# Patient Record
Sex: FEMALE | Race: WHITE | URBAN - METROPOLITAN AREA
[De-identification: names, ages, dates, MRNs, and addresses within clinical notes are randomized per-mention and may not be internally consistent; named-entity substitution may affect disease eponyms.]

---

## 2017-03-29 RX ORDER — PRAVASTATIN SODIUM 20 MG/1
TABLET ORAL
Qty: 90 TAB | Refills: 4 | Status: SHIPPED | OUTPATIENT
Start: 2017-03-29

## 2017-04-27 ENCOUNTER — OFFICE VISIT (OUTPATIENT)
Dept: ENDOCRINOLOGY | Age: 67
End: 2017-04-27

## 2017-04-27 VITALS
RESPIRATION RATE: 20 BRPM | DIASTOLIC BLOOD PRESSURE: 68 MMHG | HEIGHT: 66 IN | BODY MASS INDEX: 22.02 KG/M2 | OXYGEN SATURATION: 97 % | SYSTOLIC BLOOD PRESSURE: 131 MMHG | TEMPERATURE: 97.3 F | WEIGHT: 137 LBS | HEART RATE: 90 BPM

## 2017-04-27 DIAGNOSIS — I10 ESSENTIAL HYPERTENSION: ICD-10-CM

## 2017-04-27 DIAGNOSIS — R79.89 ELEVATED LFTS: ICD-10-CM

## 2017-04-27 DIAGNOSIS — E11.65 UNCONTROLLED TYPE 2 DIABETES MELLITUS WITH HYPERGLYCEMIA, WITHOUT LONG-TERM CURRENT USE OF INSULIN (HCC): Primary | ICD-10-CM

## 2017-04-27 RX ORDER — GLIPIZIDE 10 MG/1
TABLET ORAL
Qty: 45 TAB | Refills: 6 | Status: SHIPPED | OUTPATIENT
Start: 2017-04-27

## 2017-04-27 RX ORDER — METFORMIN HYDROCHLORIDE 500 MG/1
500 TABLET ORAL
Qty: 30 TAB | Refills: 6 | Status: SHIPPED | OUTPATIENT
Start: 2017-04-27

## 2017-04-27 RX ORDER — GABAPENTIN 100 MG/1
1 CAPSULE ORAL 3 TIMES DAILY
Refills: 2 | COMMUNITY
Start: 2017-03-20

## 2017-04-27 NOTE — PROGRESS NOTES
HISTORY OF PRESENT ILLNESS  Rayne Live is a 69 y.o. female. HPI  Patient here for  F/u after last visit of Type 2 diabetes mellitus from oct 28 2016     Lost 10  lbs   She is doing TLC     She has not had any  lows  on  glipizide   Feels  good     Here to discuss labs            Old history :  .   Referred : by self/pcp    H/o diabetes for 4 months   She had to go for PET scan and could not get it done as her blood sugars are very high   She has no control on diet     She has several cancers , Renal cell , Breast right , Lung cancer left side     She was sick and ended up at ER - and was told to f/u on DM2   In the process of evaluation -  She had lesions of spleen and liver   She had to go for PET scan and could nt be sone sooner -- because of high sugars     Current A1C is 7 %  and symptoms/problems include hyperglycemias   Blood sugars are above 200 mg     Current diabetic medications include glucophage 500 mg bid and glipizide xr 5 mg a day     Current monitoring regimen: home blood tests - 2 times daily  Home blood sugar records: trend: fluctuating a lot  Any episodes of hypoglycemia? no    Weight trend: fluctuating a lot  Prior visit with dietician: no  Current diet: \"unhealthy\" diet in general  Current exercise:  regular exercise,   Known diabetic complications: none  Cardiovascular risk factors: diabetes mellitus, stress, post-menopausal    Eye exam current (within one year): yes  FRANCIA: no     Past Medical History:   Diagnosis Date    Cancer ([facility] [address]     renal, lung, breast     Past Surgical History:   Procedure Laterality Date    HX HYSTERECTOMY      age 23 d/t tumor    HX MASTECTOMY      2010 rt breast    HX OTHER SURGICAL Right     stent placement      Current Outpatient Prescriptions   Medication Sig    gabapentin (NEURONTIN) 100 mg capsule Take 1 Cap by mouth three (3) times daily.     pravastatin (PRAVACHOL) 20 mg tablet TAKE 1 TABLET BY MOUTH AT BEDTIME    metFORMIN (GLUCOPHAGE) 500 mg tablet Take 1 Tab by mouth daily (with breakfast).  glipiZIDE (GLUCOTROL) 10 mg tablet TAKE 1 TABLET BY MOUTH 20 MINUTES BEFORE BREAKFAST AND DINNER, STOP GLIPIZIDE ER    FREESTYLE LITE STRIPS strip USE TO CHECK BLOOD SUGAR 2 TIMES DAILY. DX CODE 250.00    amLODIPine (NORVASC) 5 mg tablet Take 1 Tab by mouth daily.  Lancets (FREESTYLE LANCETS) misc Use to check blood sugar 2 times daily. Dx code 80.1    clopidogrel (PLAVIX) 75 mg tablet Take  by mouth daily.  ergocalciferol (DRISDOL) 50,000 unit capsule Take 1 cap by mouth once a week  for 2 months then 1 cap by mouth every 2 weeks for 2 months    nabumetone (RELAFEN) 750 mg tablet Take 750 mg by mouth two (2) times a day.  lansoprazole (PREVACID) 30 mg capsule Take  by mouth Daily (before breakfast). No current facility-administered medications for this visit. Review of Systems   Constitutional: Negative. HENT: Negative. Eyes: Negative for pain and redness. Respiratory: Negative. Cardiovascular: Negative for chest pain, palpitations and leg swelling. Gastrointestinal: Negative. Negative for constipation. Genitourinary: Negative. Musculoskeletal: Negative for myalgias. Skin: Negative. Neurological: Negative. Endo/Heme/Allergies: Negative. Psychiatric/Behavioral: Negative for depression and memory loss. The patient does not have insomnia. Physical Exam   Constitutional: She is oriented to person, place, and time. She appears well-developed and well-nourished. HENT:   Head: Normocephalic. Eyes: Conjunctivae and EOM are normal. Pupils are equal, round, and reactive to light. Neck: Normal range of motion. Neck supple. No JVD present. No tracheal deviation present. No thyromegaly present. Cardiovascular: Normal rate, regular rhythm and normal heart sounds. No murmur heard. Pulmonary/Chest: Breath sounds normal.   Abdominal: Soft.  Bowel sounds are normal.   Musculoskeletal: Normal range of motion. Lymphadenopathy:     She has no cervical adenopathy. Neurological: She is alert and oriented to person, place, and time. She has normal reflexes. Skin: Skin is warm. Psychiatric: She has a normal mood and affect. 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   Good pulses present         Lab Results   Component Value Date/Time    Hemoglobin A1c 5.4 04/20/2017 11:10 AM    Hemoglobin A1c 5.7 10/26/2016 01:52 PM    Hemoglobin A1c 5.8 04/18/2016 01:48 PM    Glucose 162 04/20/2017 11:10 AM    Glucose  07/14/2015 01:47 PM    Microalb/Creat ratio (ug/mg creat.) 37.0 04/20/2017 11:10 AM    LDL, calculated 54 04/20/2017 11:10 AM    Creatinine 1.02 04/20/2017 11:10 AM      Lab Results   Component Value Date/Time    Cholesterol, total 118 04/20/2017 11:10 AM    HDL Cholesterol 49 04/20/2017 11:10 AM    LDL, calculated 54 04/20/2017 11:10 AM    Triglyceride 73 04/20/2017 11:10 AM     Lab Results   Component Value Date/Time    ALT (SGPT) 22 04/20/2017 11:10 AM    AST (SGOT) 47 04/20/2017 11:10 AM    Alk. phosphatase 152 04/20/2017 11:10 AM    Bilirubin, total 0.4 04/20/2017 11:10 AM     Lab Results   Component Value Date/Time    GFR est AA 65 04/20/2017 11:10 AM    GFR est non-AA 56 04/20/2017 11:10 AM    Creatinine 1.02 04/20/2017 11:10 AM    BUN 14 04/20/2017 11:10 AM    Sodium 141 04/20/2017 11:10 AM    Potassium 4.7 04/20/2017 11:10 AM    Chloride 101 04/20/2017 11:10 AM    CO2 21 04/20/2017 11:10 AM          ASSESSMENT and PLAN     1.  Type 2 DM uncontrolled : a1c is  5.4 %      From   April 2017    Compared to   5.7 %  From   Oct 2016  Compared to  5.8 %   From April 2016  Compared to  6 %    From oct 2015 compared to   6.2 %    From June 2015 compared to   5.9 %  From march 20-15 compared to 5.3 %    From dec 2014 compared to  5.4 %  From June 2014 compared to   5.5 % from March 2014 compared to   5.7 % from dec 2013 compared to  7.2 % from nov 2013     She maintained excellent control thus far Her log is great   I  stopped glucophage   For creat of 1.5  , in the past     Creat is  down to  1.12  And started on metformin ER back at 500 mg a day last visit in oct 2016  Decreased  glipizide to 5 mg AM and 10 mg PM      She has done well since - April 2017 visit     Her GFR fluctuates and it is in better condition than before   egfr 57 ml/ min     Reviewed the glucose log  In good control     Advised again about meal consistency and timing of glipzide     Patient is advised to check blood sugars 2 times daily by rotation method. reviewed medications and side effects in detail  lab results and schedule of future lab studies reviewed with patient      2. Hypoglycemia :  Educated on treating the hypoglycemia. 3. HTN : only on norvasc 5 mg a day    Single kidney  She is off prinivil 10 mg a day (  stopped )  Patient is educated about importance of compliance with anti-hypertensives especially ARB/ACEI    4. Dyslipidemia : continue pravachol, NMR lipo is some better    Patient is educated about benefits and adverse effects of statins and explained how benefits outweigh risk. 5. use of aspirin to prevent MI and TIA's discussed    6. H/o cancers -- kidney, breast, lung  - multiple cancers   She has one kidney   She is  Coping up with it gradually     7. Neuropathy :  elavil  50 mg hs, no neurontin    8. PAD : She is s/p surgery on right LE        9. Vit d deficiency :  continue drisdol , levels missed       10. CRI -  Creat is down to 1.12 compared to   1.4   From march 2015 compared to 1.5 compared to 1.4 from July 2014   Stopped metformin   Stage 2 ckd - stable         11.  Osteoporosis : get DEXA   On tamoxifen       12. hypoalb  noted - encouraged more protein         13. elevated LFT - will get  Liver usg   Check labs in 3 months         F/u in 6 months   > 50 % of time is spent on counseling

## 2017-04-27 NOTE — MR AVS SNAPSHOT
Visit Information Date & Time Provider Department Dept. Phone Encounter #  
 4/27/2017  1:30 PM Ramirez Ortiz MD [facility] Diabetes & Endocrinology [phone] [id number] Follow-up Instructions Return in about 6 months (around 10/27/2017). Upcoming Health Maintenance Date Due Hepatitis C Screening 1950 FOOT EXAM Q1 1/22/1958 EYE EXAM RETINAL OR DILATED Q1 1/22/1958 DTaP/Tdap/Td series (1 - Tdap) 1/22/1969 BREAST CANCER SCRN MAMMOGRAM 1/22/1998 FOBT Q 1 YEAR AGE 50-75 1/22/1998 ZOSTER VACCINE AGE 60> 1/22/2008 GLAUCOMA SCREENING Q2Y 1/22/2013 Pneumococcal 65+ High/Highest Risk (1 of 2 - PCV13) 1/22/2013 MEDICARE YEARLY EXAM 1/22/2013 INFLUENZA AGE 9 TO ADULT 8/1/2016 HEMOGLOBIN A1C Q6M 10/20/2017 MICROALBUMIN Q1 4/20/2018 LIPID PANEL Q1 4/20/2018 Allergies as of 4/27/2017  Review Complete On: 4/27/2017 By: Ramirez Ortiz MD  
  
 Severity Noted Reaction Type Reactions Lortab [Hydrocodone-acetaminophen]  11/07/2013    Other (comments) After surgery/hallucinations Current Immunizations  Never Reviewed No immunizations on file. Not reviewed this visit You Were Diagnosed With   
  
 Codes Comments Uncontrolled type 2 diabetes mellitus with hyperglycemia, without long-term current use of [facility]    -  Primary ICD-10-CM: E11.65 ICD-9-CM: 250.02 Essential hypertension     ICD-10-CM: I10 
ICD-9-CM: 401.9 Elevated LFTs     ICD-10-CM: R94.5 ICD-9-CM: 790.6 Vitals BP Pulse Temp Resp Height(growth percentile) Weight(growth percentile) 131/68 90 97.3 °F (36.3 °C) (Oral) 20 5' 6\" (1.676 m) 137 lb (62.1 kg) SpO2 BMI OB Status Smoking Status 97% 22.11 kg/m2 Hysterectomy Former Smoker BMI and BSA Data Body Mass Index Body Surface Area  
 22.11 kg/m 2 1.7 m 2 Preferred Pharmacy Pharmacy Name Phone [id number]- [facility], [address] [place] [address] [phone] Your Updated Medication List  
  
   
This list is accurate as of: 4/27/17  2:43 PM.  Always use your most recent med list. amLODIPine 5 mg tablet Commonly known as:  Claudell Hesselbach Take 1 Tab by mouth daily. ergocalciferol 50,000 unit capsule Commonly known as:  DRISDOL Take 1 cap by mouth once a week  for 2 months then 1 cap by mouth every 2 weeks for 2 months FREESTYLE LITE STRIPS strip Generic drug:  glucose blood VI test strips USE TO CHECK BLOOD SUGAR 2 TIMES DAILY. DX CODE 250.00  
  
 gabapentin 100 mg capsule Commonly known as:  NEURONTIN Take 1 Cap by mouth three (3) times daily. glipiZIDE 10 mg tablet Commonly known as:  GLUCOTROL  
TAKE 1 TABLET BY MOUTH 20 MINUTES BEFORE BREAKFAST AND DINNER, STOP GLIPIZIDE ER Lancets Misc Commonly known as:  FREESTYLE LANCETS Use to check blood sugar 2 times daily. Dx code 250.00  
  
 lansoprazole 30 mg capsule Commonly known as:  PREVACID Take  by mouth Daily (before breakfast). metFORMIN 500 mg tablet Commonly known as:  GLUCOPHAGE Take 1 Tab by mouth daily (with breakfast). nabumetone 750 mg tablet Commonly known as:  RELAFEN Take 750 mg by mouth two (2) times a day. PLAVIX 75 mg Tab Generic drug:  clopidogrel Take  by mouth daily. pravastatin 20 mg tablet Commonly known as:  PRAVACHOL  
TAKE 1 TABLET BY MOUTH AT BEDTIME Follow-up Instructions Return in about 6 months (around 10/27/2017). To-Do List   
 04/27/2017 Imaging:  US ABD COMP Patient Instructions   
 
 
 
glipizide  10 mg  Half pill twenty minutes before b-fast and   And   Full pill  for dinner Metformin er 500 mg once a day with food Introducing [facility]!    
 Aileen Cee introduces [employer] patient portal. Now you can access parts of your medical record, email your doctor's office, and request medication refills online. 1. In your internet browser, go to [email or web address] 2. Click on the First Time User? Click Here link in the Sign In box. You will see the New Member Sign Up page. 3. Enter your [employer] Access Code exactly as it appears below. You will not need to use this code after youve completed the sign-up process. If you do not sign up before the expiration date, you must request a new code. · [employer] Access Code: [email or web address] Expires: 7/26/2017  2:41 PM 
 
4. Enter the last four digits of your Social Security Number (xxxx) and Date of Birth (mm/dd/yyyy) as indicated and click Submit. You will be taken to the next sign-up page. 5. Create a [employer] ID. This will be your [employer] login ID and cannot be changed, so think of one that is secure and easy to remember. 6. Create a [employer] password. You can change your password at any time. 7. Enter your Password Reset Question and Answer. This can be used at a later time if you forget your password. 8. Enter your e-mail address. You will receive e-mail notification when new information is available in [address] 9. Click Sign Up. You can now view and download portions of your medical record. 10. Click the Download Summary menu link to download a portable copy of your medical information. If you have questions, please visit the Frequently Asked Questions section of the [employer] website. Remember, [employer] is NOT to be used for urgent needs. For medical emergencies, dial 911. Now available from your iPhone and Android! Please provide this summary of care documentation to your next provider. Your primary care clinician is listed as Suni Jackson. If you have any questions after today's visit, please call [phone].

## 2017-04-27 NOTE — PROGRESS NOTES
Wt Readings from Last 3 Encounters:   04/27/17 137 lb (62.1 kg)   10/28/16 147 lb (66.7 kg)   04/22/16 152 lb (68.9 kg)     Temp Readings from Last 3 Encounters:   04/27/17 97.3 °F (36.3 °C) (Oral)   10/28/16 97.4 °F (36.3 °C)   04/22/16 97.7 °F (36.5 °C) (Oral)     BP Readings from Last 3 Encounters:   04/27/17 131/68   10/28/16 127/50   04/22/16 145/69     Pulse Readings from Last 3 Encounters:   04/27/17 90   10/28/16 79   04/22/16 78     Lab Results   Component Value Date/Time    Hemoglobin A1c 5.4 04/20/2017 11:10 AM    Hemoglobin A1c (POC) 5.4 07/07/2014 11:54 AM     No Podiatry  Last eye exam unknown

## 2017-04-27 NOTE — PATIENT INSTRUCTIONS
glipizide  10 mg  Half pill twenty minutes before b-fast and   And   Full pill  for dinner      Metformin er 500 mg once a day with food

## 2017-05-12 ENCOUNTER — TELEPHONE (OUTPATIENT)
Dept: ENDOCRINOLOGY | Age: 67
End: 2017-05-12

## 2017-05-12 NOTE — TELEPHONE ENCOUNTER
Regina Broussard called from [facility] and stated patient had US today due to elevated liver enzymes. The US is abnormal and shows multiple lesions on liver. Regina Broussard concerned due to patient's history and recommends further testing. [facility] and they are faxing over a copy of report.

## 2017-05-12 NOTE — TELEPHONE ENCOUNTER
Spoke with patient and informed her of US results in regards to right pleural fluid and multiple lesions on liver noted. Asked patient if she was having SOB. Patient stated on occasion she has slight SOB. Instructed patient if that should get worse over the weekend she needs to go to ER. Patient verbalized understanding. Stated if it does not get worse will call primary care physician on Monday morning. Also instructed patient to call oncologist, Dequan Pearson as soon as possible. Stated she would attempt to call now and if not able to reach them will call first thing Monday morning. Instructed patient to call back if she has any further questions.

## 2017-05-21 RX ORDER — METFORMIN HYDROCHLORIDE 500 MG/1
TABLET ORAL
Qty: 30 TAB | Refills: 6 | Status: SHIPPED | OUTPATIENT
Start: 2017-05-21

## 2017-05-26 DIAGNOSIS — E11.65 UNCONTROLLED TYPE 2 DIABETES MELLITUS WITH HYPERGLYCEMIA, WITHOUT LONG-TERM CURRENT USE OF INSULIN (HCC): ICD-10-CM

## 2017-05-26 DIAGNOSIS — R79.89 ELEVATED LFTS: ICD-10-CM

## 2017-05-26 DIAGNOSIS — I10 ESSENTIAL HYPERTENSION: ICD-10-CM

## 2017-06-02 RX ORDER — GLIPIZIDE 10 MG/1
TABLET ORAL
Qty: 60 TAB | Refills: 5 | Status: SHIPPED | OUTPATIENT
Start: 2017-06-02

## 2017-08-01 ENCOUNTER — TELEPHONE (OUTPATIENT)
Dept: ENDOCRINOLOGY | Age: 67
End: 2017-08-01

## 2017-08-01 NOTE — TELEPHONE ENCOUNTER
Pt daughter-in-law called stating that she was int [facility] for blood clots in her leg and chest and was discharged yesterday. She stated that she was not given Metformin and Glipizide while she was in the hospital. She said that they were giving her insulin because her sugars went up and when discharged, gave her an rx for insulin. She said the pt doesn't normally take insulin and they're confused as what to do. Please call the pt and let her know. The daughter-in-law also asked if she should be brought in to be seen.

## 2017-08-02 NOTE — TELEPHONE ENCOUNTER
She could follow for now the insulin instructions she got at discharge and she also has to be second on cancellation  list for f/u

## 2017-08-07 NOTE — TELEPHONE ENCOUNTER
Spoke with pt and asked her if she would like to come in on Friday, August 18th at 2:15pm. Pt stated she would call back when she has her calendar